# Patient Record
Sex: MALE | Race: WHITE | NOT HISPANIC OR LATINO | Employment: FULL TIME | URBAN - NONMETROPOLITAN AREA
[De-identification: names, ages, dates, MRNs, and addresses within clinical notes are randomized per-mention and may not be internally consistent; named-entity substitution may affect disease eponyms.]

---

## 2017-03-02 ENCOUNTER — OFFICE VISIT (OUTPATIENT)
Dept: FAMILY MEDICINE CLINIC | Facility: CLINIC | Age: 32
End: 2017-03-02

## 2017-03-02 VITALS
HEIGHT: 75 IN | SYSTOLIC BLOOD PRESSURE: 122 MMHG | HEART RATE: 82 BPM | WEIGHT: 292.8 LBS | DIASTOLIC BLOOD PRESSURE: 76 MMHG | BODY MASS INDEX: 36.41 KG/M2

## 2017-03-02 DIAGNOSIS — Z02.89 ENCOUNTER FOR EXAMINATION REQUIRED BY DEPARTMENT OF TRANSPORTATION (DOT): Primary | ICD-10-CM

## 2017-03-02 PROCEDURE — DOTPHY: Performed by: NURSE PRACTITIONER

## 2017-03-02 NOTE — PROGRESS NOTES
Claudy Franklin is a 31 y.o. male who presents to the office for a DOT Exam. See Federal [employer] examination form for details of this visit.

## 2018-02-06 ENCOUNTER — OFFICE VISIT (OUTPATIENT)
Dept: FAMILY MEDICINE CLINIC | Facility: CLINIC | Age: 33
End: 2018-02-06

## 2018-02-06 VITALS
HEART RATE: 74 BPM | DIASTOLIC BLOOD PRESSURE: 84 MMHG | WEIGHT: 304.8 LBS | BODY MASS INDEX: 37.9 KG/M2 | SYSTOLIC BLOOD PRESSURE: 132 MMHG | HEIGHT: 75 IN

## 2018-02-06 DIAGNOSIS — Z02.89 ENCOUNTER FOR EXAMINATION REQUIRED BY DEPARTMENT OF TRANSPORTATION (DOT): Primary | ICD-10-CM

## 2018-02-06 PROCEDURE — DOTPHY: Performed by: NURSE PRACTITIONER

## 2018-02-06 RX ORDER — LISINOPRIL AND HYDROCHLOROTHIAZIDE 12.5; 1 MG/1; MG/1
1 TABLET ORAL
COMMUNITY
Start: 2017-11-29

## 2018-02-06 NOTE — PROGRESS NOTES
Claudy Franklin is a 32 y.o. male who presents to the office for a DOT Exam. See Federal [employer] examination form for details of this visit.

## 2019-01-08 ENCOUNTER — CLINICAL SUPPORT (OUTPATIENT)
Dept: FAMILY MEDICINE CLINIC | Facility: CLINIC | Age: 34
End: 2019-01-08

## 2019-01-08 VITALS
HEART RATE: 78 BPM | WEIGHT: 286.2 LBS | HEIGHT: 75 IN | DIASTOLIC BLOOD PRESSURE: 84 MMHG | SYSTOLIC BLOOD PRESSURE: 130 MMHG | BODY MASS INDEX: 35.59 KG/M2

## 2019-01-08 DIAGNOSIS — Z02.89 ENCOUNTER FOR EXAMINATION REQUIRED BY DEPARTMENT OF TRANSPORTATION (DOT): Primary | ICD-10-CM

## 2019-01-08 PROCEDURE — DOTPHY: Performed by: NURSE PRACTITIONER

## 2019-01-08 RX ORDER — BUSPIRONE HYDROCHLORIDE 7.5 MG/1
TABLET ORAL
Refills: 0 | COMMUNITY
Start: 2018-12-14

## 2019-01-08 RX ORDER — ASPIRIN 81 MG/1
TABLET, CHEWABLE ORAL
COMMUNITY
Start: 2018-03-02

## 2019-01-08 NOTE — PROGRESS NOTES
Claudy Franklin is a 33 y.o. male who presents to the office for a DOT Exam. See Federal [employer] examination form for details of this visit.

## 2020-12-15 ENCOUNTER — CLINICAL SUPPORT (OUTPATIENT)
Dept: FAMILY MEDICINE CLINIC | Facility: CLINIC | Age: 35
End: 2020-12-15

## 2020-12-15 VITALS
HEART RATE: 67 BPM | SYSTOLIC BLOOD PRESSURE: 122 MMHG | WEIGHT: 269.2 LBS | HEIGHT: 75 IN | DIASTOLIC BLOOD PRESSURE: 80 MMHG | BODY MASS INDEX: 33.47 KG/M2

## 2020-12-15 DIAGNOSIS — Z02.89 ENCOUNTER FOR EXAMINATION REQUIRED BY DEPARTMENT OF TRANSPORTATION (DOT): Primary | ICD-10-CM

## 2020-12-15 PROCEDURE — DOTPHY: Performed by: NURSE PRACTITIONER

## 2020-12-15 RX ORDER — ERGOCALCIFEROL 1.25 MG/1
CAPSULE ORAL
COMMUNITY
Start: 2020-11-30

## 2020-12-15 NOTE — PROGRESS NOTES
Claudy Franklin is a 35 y.o. male who presents to the office for a DOT Exam. See Federal [employer] examination form for details of this visit.

## 2021-11-23 ENCOUNTER — CLINICAL SUPPORT (OUTPATIENT)
Dept: FAMILY MEDICINE CLINIC | Facility: CLINIC | Age: 36
End: 2021-11-23

## 2021-11-23 VITALS
OXYGEN SATURATION: 98 % | HEART RATE: 75 BPM | HEIGHT: 75 IN | WEIGHT: 277 LBS | DIASTOLIC BLOOD PRESSURE: 84 MMHG | SYSTOLIC BLOOD PRESSURE: 122 MMHG | BODY MASS INDEX: 34.44 KG/M2

## 2021-11-23 DIAGNOSIS — Z02.89 ENCOUNTER FOR EXAMINATION REQUIRED BY DEPARTMENT OF TRANSPORTATION (DOT): Primary | ICD-10-CM

## 2021-11-23 PROCEDURE — DOTPHY: Performed by: NURSE PRACTITIONER

## 2021-11-23 NOTE — PROGRESS NOTES
Claudy Franklin is a 36 y.o. male who presents to the office for a DOT Exam. See Federal [employer] examination form for details of this visit.

## 2022-11-02 ENCOUNTER — CLINICAL SUPPORT (OUTPATIENT)
Dept: FAMILY MEDICINE CLINIC | Facility: CLINIC | Age: 37
End: 2022-11-02

## 2022-11-02 VITALS
BODY MASS INDEX: 36.18 KG/M2 | OXYGEN SATURATION: 97 % | WEIGHT: 291 LBS | TEMPERATURE: 97.1 F | SYSTOLIC BLOOD PRESSURE: 122 MMHG | HEIGHT: 75 IN | HEART RATE: 67 BPM | DIASTOLIC BLOOD PRESSURE: 80 MMHG

## 2022-11-02 DIAGNOSIS — Z02.89 ENCOUNTER FOR EXAMINATION REQUIRED BY DEPARTMENT OF TRANSPORTATION (DOT): Primary | ICD-10-CM

## 2022-11-02 PROCEDURE — DOTPHY: Performed by: NURSE PRACTITIONER

## 2022-11-02 NOTE — PROGRESS NOTES
Claudy Franklin is a 37 y.o. male who presents to the office for a DOT Exam. See Federal [employer] examination form for details of this visit.